# Patient Record
Sex: MALE | Race: WHITE | URBAN - METROPOLITAN AREA
[De-identification: names, ages, dates, MRNs, and addresses within clinical notes are randomized per-mention and may not be internally consistent; named-entity substitution may affect disease eponyms.]

---

## 2017-01-25 ENCOUNTER — OFFICE VISIT (OUTPATIENT)
Dept: URGENT CARE | Facility: URGENT CARE | Age: 62
End: 2017-01-25
Payer: COMMERCIAL

## 2017-01-25 VITALS
SYSTOLIC BLOOD PRESSURE: 134 MMHG | WEIGHT: 314 LBS | OXYGEN SATURATION: 98 % | HEART RATE: 108 BPM | BODY MASS INDEX: 37.73 KG/M2 | TEMPERATURE: 100.3 F | DIASTOLIC BLOOD PRESSURE: 90 MMHG

## 2017-01-25 DIAGNOSIS — J02.8 ACUTE PHARYNGITIS DUE TO OTHER SPECIFIED ORGANISMS: ICD-10-CM

## 2017-01-25 DIAGNOSIS — J01.90 ACUTE SINUSITIS WITH SYMPTOMS > 10 DAYS: ICD-10-CM

## 2017-01-25 DIAGNOSIS — J02.0 ACUTE STREPTOCOCCAL PHARYNGITIS: Primary | ICD-10-CM

## 2017-01-25 LAB
DEPRECATED S PYO AG THROAT QL EIA: ABNORMAL
MICRO REPORT STATUS: ABNORMAL
SPECIMEN SOURCE: ABNORMAL

## 2017-01-25 PROCEDURE — 99212 OFFICE O/P EST SF 10 MIN: CPT | Performed by: FAMILY MEDICINE

## 2017-01-25 PROCEDURE — 87880 STREP A ASSAY W/OPTIC: CPT | Performed by: FAMILY MEDICINE

## 2017-01-25 RX ORDER — AMOXICILLIN 875 MG
875 TABLET ORAL 2 TIMES DAILY
Qty: 20 TABLET | Refills: 0 | Status: SHIPPED | OUTPATIENT
Start: 2017-01-25 | End: 2017-02-04

## 2017-01-25 RX ORDER — MOMETASONE FUROATE MONOHYDRATE 50 UG/1
2 SPRAY, METERED NASAL DAILY
Qty: 1 BOX | Refills: 1 | Status: SHIPPED | OUTPATIENT
Start: 2017-01-25

## 2017-01-25 NOTE — PATIENT INSTRUCTIONS
Saline nasal rinses    Steam or Humidifier for the nose    Drink plenty of liquids    Warm saltwater gargles    Ibuprofen, Tylenol for the pain or fever.     follow up if not better in 10-14 days.

## 2017-01-25 NOTE — MR AVS SNAPSHOT
"              After Visit Summary   1/25/2017    Vasile Roberts    MRN: [id number]           Patient Information     Date Of Birth          1955        Visit Information        Provider Department      1/25/2017 3:30 PM Mendez Chaudhry MD [facility]        Today's Diagnoses     Acute streptococcal pharyngitis    -  1     Acute pharyngitis due to other specified organisms         Acute sinusitis with symptoms > 10 days           Care Instructions    Saline nasal rinses    Steam or Humidifier for the nose    Drink plenty of liquids    Warm saltwater gargles    Ibuprofen, Tylenol for the pain or fever.     follow up if not better in 10-14 days.         Follow-ups after your visit        Who to contact     If you have questions or need follow up information about today's clinic visit or your schedule please contact [facility] directly at [phone].  Normal or non-critical lab and imaging results will be communicated to you by Centrafusehart, letter or phone within 4 business days after the clinic has received the results. If you do not hear from us within 7 days, please contact the clinic through MyChart or phone. If you have a critical or abnormal lab result, we will notify you by phone as soon as possible.  Submit refill requests through App[employer] or call your pharmacy and they will forward the refill request to us. Please allow 3 business days for your refill to be completed.          Additional Information About Your Visit        MyChart Information     [employer] lets you send messages to your doctor, view your test results, renew your prescriptions, schedule appointments and more. To sign up, go to [email or web address] . Click on \"Log in\" on the left side of the screen, which will take you to the Welcome page. Then click on \"Sign up Now\" on the right side of the page.     You will be asked to enter the access code listed below, as well as some personal information. Please follow the " directions to create your username and password.     Your access code is: [email or web address]  Expires: 2017  4:57 PM     Your access code will  in 90 days. If you need help or a new code, please call your [place] clinic or [phone].        Care EveryWhere ID     This is your Care EveryWhere ID. This could be used by other organizations to access your [place] medical records  [id number]        Your Vitals Were     Pulse Temperature Pulse Oximetry             108 100.3  F (37.9  C) (Tympanic) 98%          Blood Pressure from Last 3 Encounters:   17 134/90   16 140/90    Weight from Last 3 Encounters:   17 314 lb (142.429 kg)   16 314 lb 14.4 oz (142.838 kg)              We Performed the Following     Strep, Rapid Screen          Today's Medication Changes          These changes are accurate as of: 17  4:57 PM.  If you have any questions, ask your nurse or doctor.               Start taking these medicines.        Dose/Directions    amoxicillin 875 MG tablet   Commonly known as:  AMOXIL   Used for:  Acute streptococcal pharyngitis, Acute sinusitis with symptoms > 10 days   Started by:  Mendez Chaudhry MD        Dose:  875 mg   Take 1 tablet (875 mg) by mouth 2 times daily for 10 days   Quantity:  20 tablet   Refills:  0       mometasone 50 MCG/ACT spray   Commonly known as:  NASONEX   Used for:  Acute sinusitis with symptoms > 10 days   Started by:  Mendez Chaudhry MD        Dose:  2 spray   Spray 2 sprays into both nostrils daily   Quantity:  1 Box   Refills:  1            Where to get your medicines      Some of these will need a paper prescription and others can be bought over the counter.  Ask your nurse if you have questions.     Bring a paper prescription for each of these medications    - amoxicillin 875 MG tablet  - mometasone 50 MCG/ACT spray             Primary Care Provider    None Specified       No primary provider on file.        Thank you!     Thank you for choosing  [facility]  for your care. Our goal is always to provide you with excellent care. Hearing back from our patients is one way we can continue to improve our services. Please take a few minutes to complete the written survey that you may receive in the mail after your visit with us. Thank you!             Your Updated Medication List - Protect others around you: Learn how to safely use, store and throw away your medicines at www.disposemymeds.org.          This list is accurate as of: 1/25/17  4:57 PM.  Always use your most recent med list.                   Brand Name Dispense Instructions for use    amoxicillin 875 MG tablet    AMOXIL    20 tablet    Take 1 tablet (875 mg) by mouth 2 times daily for 10 days       mometasone 50 MCG/ACT spray    NASONEX    1 Box    Spray 2 sprays into both nostrils daily

## 2017-01-25 NOTE — NURSING NOTE
"Chief Complaint   Patient presents with     Urgent Care     nasal congestion, cough, HA, ST. x 2 months       Initial /90 mmHg  Pulse 108  Temp(Src) 100.3  F (37.9  C) (Tympanic)  Wt 314 lb (142.429 kg)  SpO2 98% Estimated body mass index is 37.73 kg/(m^2) as calculated from the following:    Height as of 9/22/16: 6' 4.5\" (1.943 m).    Weight as of this encounter: 314 lb (142.429 kg).  BP completed using cuff size: large.Katalina MAC MA      "

## 2017-01-25 NOTE — Clinical Note
[facility]  [address]  [address]  [place] [address] [phone]      January 25, 2017    RE:  Vasile Roberts                                                                                                                                                       [address]   [place] [address]            To whom it may concern:    Vasile Roberts is under my professional care at the [facility] on January 25, 2017.  Because of his current medical illness, please excuse his absence from work on January 26, 2017.    He  may return to work once he starts to feel better.         Sincerely,        Mendez Chaudhry MD    [facility]

## 2017-01-25 NOTE — PROGRESS NOTES
"SUBJECTIVE:   Vasile Roberts is a 61 year old male presenting with a chief complaint of nasal congestion, nasal discharge (thick green/white),  cough (occasional coughing attacks, nonproductive cough), headache (at the temples, moderate headache, \"just hurts\"), sore throat (moderate pain).  Onset of symptoms was two months ago for the nasal congestion, and a few days ago for the other symptoms.   Course of illness is worsening.     Current and Associated symptoms: as listed above.   Treatment measures tried include none. .  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     Past medical history:    No major medical problems.     No current outpatient prescriptions on file.     Social History   Substance Use Topics     Smoking status: Current Some Day Smoker     Types: Pipe     Smokeless tobacco: Never Used     Alcohol Use: 0.0 oz/week     0 Standard drinks or equivalent per week       ROS:  Review of systems negative except as stated above.    OBJECTIVE  :/90 mmHg  Pulse 108  Temp(Src) 100.3  F (37.9  C) (Tympanic)  Wt 314 lb (142.429 kg)  SpO2 98%  GENERAL APPEARANCE: healthy, alert and no distress.  Voice sounds as though the nose is congested.   HENT: TM's normal bilaterally, nasal turbinates erythematous, swollen and oropharynx is erythematous without exudates.   NECK: supple, nontender, no lymphadenopathy  RESP: lungs clear to auscultation - no rales, rhonchi or wheezes  CV: regular rates and rhythm, normal S1 S2, no murmur noted    RST:    Results for orders placed or performed in visit on 01/25/17   Strep, Rapid Screen   Result Value Ref Range    Specimen Description Throat     Rapid Strep A Screen (A)      POSITIVE: Group A Streptococcal antigen detected by immunoassay.    Micro Report Status FINAL 01/25/2017          ASSESSMENT:  Acute Pharyngitis  Strep Pharyngitis  Acute Sinusitis.      PLAN:  Rx:  Flonase, Nasonex  Steam, humidifier  Warm saltwater gargles  Ice-cold " water  Ibuprofen, Tylenol for pain and for fever.   follow up if not better in 10-14 days.   See orders in Epic    Mendez Chaudhry MD

## 2017-05-27 ENCOUNTER — OFFICE VISIT (OUTPATIENT)
Dept: URGENT CARE | Facility: URGENT CARE | Age: 62
End: 2017-05-27

## 2017-05-27 VITALS
HEART RATE: 89 BPM | TEMPERATURE: 97.6 F | SYSTOLIC BLOOD PRESSURE: 142 MMHG | WEIGHT: 313.8 LBS | BODY MASS INDEX: 37.7 KG/M2 | OXYGEN SATURATION: 98 % | DIASTOLIC BLOOD PRESSURE: 94 MMHG

## 2017-05-27 DIAGNOSIS — J01.90 ACUTE SINUSITIS WITH SYMPTOMS > 10 DAYS: Primary | ICD-10-CM

## 2017-05-27 PROCEDURE — 99213 OFFICE O/P EST LOW 20 MIN: CPT | Performed by: FAMILY MEDICINE

## 2017-05-27 RX ORDER — AMOXICILLIN 875 MG
875 TABLET ORAL 2 TIMES DAILY
Qty: 20 TABLET | Refills: 0 | Status: SHIPPED | OUTPATIENT
Start: 2017-05-27 | End: 2017-06-06

## 2017-05-27 NOTE — NURSING NOTE
"Chief Complaint   Patient presents with     Urgent Care     Sinus Problem     runny nose, intermittent left eye swelling, ears clogged x 2 wks       Initial BP (!) 142/94  Pulse 89  Temp 97.6  F (36.4  C) (Oral)  Wt (!) 313 lb 12.8 oz (142.3 kg)  SpO2 98%  BMI 37.7 kg/m2 Estimated body mass index is 37.7 kg/(m^2) as calculated from the following:    Height as of 9/22/16: 6' 4.5\" (1.943 m).    Weight as of this encounter: 313 lb 12.8 oz (142.3 kg).  Medication Reconciliation: complete      Maida Mcpherson, CHETAN                                5/27/2017 3:00 PM     "

## 2017-05-27 NOTE — MR AVS SNAPSHOT
"              After Visit Summary   5/27/2017    Vasile Roberts    MRN: [id number]           Patient Information     Date Of Birth          1955        Visit Information        Provider Department      5/27/2017 2:45 PM Mendez Chaudhry MD [facility]        Today's Diagnoses     Acute sinusitis with symptoms > 10 days    -  1      Care Instructions    Saline nasal rinses    follow up with the primary care provider if not better in 10-14 days.     Steam or Humidifier    Flonase (Fluticasone) nasal spray.  Two sprays into each nostril daily until you feel better.           Follow-ups after your visit        Who to contact     If you have questions or need follow up information about today's clinic visit or your schedule please contact [facility] directly at [phone].  Normal or non-critical lab and imaging results will be communicated to you by MyChart, letter or phone within 4 business days after the clinic has received the results. If you do not hear from us within 7 days, please contact the clinic through MyChart or phone. If you have a critical or abnormal lab result, we will notify you by phone as soon as possible.  Submit refill requests through [employer] or call your pharmacy and they will forward the refill request to us. Please allow 3 business days for your refill to be completed.          Additional Information About Your Visit        MyChart Information     [employer]o lets you send messages to your doctor, view your test results, renew your prescriptions, schedule appointments and more. To sign up, go to [email or web address] . Click on \"Log in\" on the left side of the screen, which will take you to the Welcome page. Then click on \"Sign up Now\" on the right side of the page.     You will be asked to enter the access code listed below, as well as some personal information. Please follow the directions to create your username and password.     Your access code is: " [email or web address]  Expires: 2017  3:51 PM     Your access code will  in 90 days. If you need help or a new code, please call your [place] clinic or [phone].        Care EveryWhere ID     This is your Care EveryWhere ID. This could be used by other organizations to access your [place] medical records  [id number]        Your Vitals Were     Pulse Temperature Pulse Oximetry BMI (Body Mass Index)          89 97.6  F (36.4  C) (Oral) 98% 37.7 kg/m2         Blood Pressure from Last 3 Encounters:   17 (!) 142/94   17 134/90   16 140/90    Weight from Last 3 Encounters:   17 (!) 313 lb 12.8 oz (142.3 kg)   17 (!) 314 lb (142.4 kg)   16 (!) 314 lb 14.4 oz (142.8 kg)              Today, you had the following     No orders found for display         Today's Medication Changes          These changes are accurate as of: 17  3:52 PM.  If you have any questions, ask your nurse or doctor.               Start taking these medicines.        Dose/Directions    amoxicillin 875 MG tablet   Commonly known as:  AMOXIL   Used for:  Acute sinusitis with symptoms > 10 days   Started by:  Mendez Chaudhry MD        Dose:  875 mg   Take 1 tablet (875 mg) by mouth 2 times daily for 10 days   Quantity:  20 tablet   Refills:  0            Where to get your medicines      Some of these will need a paper prescription and others can be bought over the counter.  Ask your nurse if you have questions.     Bring a paper prescription for each of these medications     amoxicillin 875 MG tablet                Primary Care Provider    None Specified       No primary provider on file.        Thank you!     Thank you for choosing [facility]  for your care. Our goal is always to provide you with excellent care. Hearing back from our patients is one way we can continue to improve our services. Please take a few minutes to complete the written survey that you may receive in the mail after your  visit with us. Thank you!             Your Updated Medication List - Protect others around you: Learn how to safely use, store and throw away your medicines at www.disposemymeds.org.          This list is accurate as of: 5/27/17  3:52 PM.  Always use your most recent med list.                   Brand Name Dispense Instructions for use    amoxicillin 875 MG tablet    AMOXIL    20 tablet    Take 1 tablet (875 mg) by mouth 2 times daily for 10 days       mometasone 50 MCG/ACT spray    NASONEX    1 Box    Spray 2 sprays into both nostrils daily       MULTIVITAMIN ADULT PO          TYLENOL PO

## 2017-05-27 NOTE — PROGRESS NOTES
SUBJECTIVE:   Vasile Roberts is a 61 year old male presenting with a chief complaint of runny nose (yellowish-green, thick discharge), nasal congestion, clogged sensation in the ears, off-and-on left eye swelling.  Patient has been mouth breathing a lot recently.  No vision problems.  No eye pain.  No light sensitivity.    Onset of symptoms was two weeks ago.  Course of illness is worsening. .    Severity severe.   Current and Associated symptoms: as listed above.   Treatment measures tried include Tylenol and over the counter nasal decongestants.  Predisposing factors include recurrent sinus infections. .    Past medical history:    No major medical problems    Current Outpatient Prescriptions   Medication Sig Dispense Refill     Multiple Vitamins-Minerals (MULTIVITAMIN ADULT PO)        Acetaminophen (TYLENOL PO)        mometasone (NASONEX) 50 MCG/ACT spray Spray 2 sprays into both nostrils daily 1 Box 1     Social History   Substance Use Topics     Smoking status: Current Some Day Smoker     Types: Pipe     Smokeless tobacco: Never Used     Alcohol use 0.0 oz/week     0 Standard drinks or equivalent per week       ROS:  Review of systems negative except as stated above.    OBJECTIVE  :BP (!) 142/94  Pulse 89  Temp 97.6  F (36.4  C) (Oral)  Wt (!) 313 lb 12.8 oz (142.3 kg)  SpO2 98%  BMI 37.7 kg/m2  GENERAL APPEARANCE: healthy, alert and no distress  HENT: TM's normal bilaterally, nasal turbinates erythematous, swollen and oral mucous membranes moist, no erythema noted  NECK: supple, nontender, no lymphadenopathy  RESP: lungs clear to auscultation - no rales, rhonchi or wheezes  CV: regular rates and rhythm, normal S1 S2, no murmur noted    ASSESSMENT:  Acute Sinsusitis    PLAN:  Rx:  Amoxicillin  Flonase  Saline nasal rinses  Humidifier, Steam  follow up with the primary care provider if not better in 10-14 days.    See orders in Epic    Mendez Chaudhry MD

## 2017-05-27 NOTE — PATIENT INSTRUCTIONS
Saline nasal rinses    follow up with the primary care provider if not better in 10-14 days.     Steam or Humidifier    Flonase (Fluticasone) nasal spray.  Two sprays into each nostril daily until you feel better.

## 2017-06-27 ENCOUNTER — NURSE TRIAGE (OUTPATIENT)
Dept: NURSING | Facility: CLINIC | Age: 62
End: 2017-06-27

## 2017-06-27 NOTE — TELEPHONE ENCOUNTER
Additional Information    Negative: Severe difficulty breathing (e.g., struggling for each breath, speaks in single words)    Negative: Sounds like a life-threatening emergency to the triager    Negative: [1] Sinus infection AND [2] taking an antibiotic AND [3] symptoms continue    Negative: [1] Difficulty breathing AND [2] not from stuffy nose (e.g., not relieved by cleaning out the nose)    Negative: [1] SEVERE headache AND [2] fever    Negative: [1] Redness or swelling on the cheek, forehead or around the eye AND [2] fever    Negative: Fever > 104 F (40 C)    Negative: Patient sounds very sick or weak to the triager    Negative: [1] SEVERE pain AND [2] not improved 2 hours after pain medicine    Negative: [1] Redness or swelling on the cheek, forehead or around the eye AND [2] no fever    Negative: [1] Fever > 101 F (38.3 C) AND [2] age > 60    Negative: [1] Fever > 101 F (38.3 C) AND [2] bedridden (e.g., nursing home patient, CVA, chronic illness, recovering from surgery)    Negative: [1] Fever > 100.5 F (38.1 C) AND [2] diabetes mellitus or weak immune system (e.g., HIV positive, cancer chemo, splenectomy, organ transplant, chronic steroids)    Negative: Fever present > 3 days (72 hours)    Negative: [1] Fever returns after gone for over 24 hours AND [2] symptoms worse or not improved    Negative: [1] Sinus pain (not just congestion) AND [2] fever    Negative: Earache    Negative: [1] Sinus congestion (pressure, fullness) AND [2] present > 10 days    Negative: [1] Nasal discharge AND [2] present > 10 days    Negative: [1] Using nasal washes and pain medicine > 24 hours AND [2] sinus pain (around cheekbone or eye) persists    Negative: Lots of coughing    [1] Sinus congestion as part of a cold AND [2] present < 10 days (all triage questions negative)    Protocols used: SINUS PAIN OR CONGESTION-ADULT-AH  Patient recently treated for sinusitis and requesting antibiotic.  Per protocol, prescription for  Amoxicillin 1000mg by mouth 3 times per day X10d called to.[facility] [place], [place] - [address] Ana Scott, RN  [facility]

## 2017-07-22 ENCOUNTER — OFFICE VISIT (OUTPATIENT)
Dept: URGENT CARE | Facility: URGENT CARE | Age: 62
End: 2017-07-22

## 2017-07-22 VITALS
HEART RATE: 105 BPM | OXYGEN SATURATION: 97 % | TEMPERATURE: 97.8 F | SYSTOLIC BLOOD PRESSURE: 144 MMHG | DIASTOLIC BLOOD PRESSURE: 84 MMHG

## 2017-07-22 DIAGNOSIS — J32.0 MAXILLARY SINUSITIS, UNSPECIFIED CHRONICITY: Primary | ICD-10-CM

## 2017-07-22 PROCEDURE — 99213 OFFICE O/P EST LOW 20 MIN: CPT | Performed by: FAMILY MEDICINE

## 2017-07-22 RX ORDER — METHYLPREDNISOLONE 4 MG
TABLET, DOSE PACK ORAL
Qty: 21 TABLET | Refills: 0 | Status: SHIPPED | OUTPATIENT
Start: 2017-07-22

## 2017-07-22 RX ORDER — FLUTICASONE PROPIONATE 50 MCG
1 SPRAY, SUSPENSION (ML) NASAL DAILY
Qty: 1 BOTTLE | Refills: 0 | Status: SHIPPED | OUTPATIENT
Start: 2017-07-22

## 2017-07-22 NOTE — PROGRESS NOTES
SUBJECTIVE:   Vasile Roberts is a 61 year old male presenting with a chief complaint of sinusitis.  He has been having sinus pain/pressure intermittently since last fall.   At that time he was given flonase and augmentin and symptoms resolved.  Then in January he had onset of sinus pain/pressure in both cheeks and thick discharge that was treated with nasonex and amoxicillin.  Slightly improved, but never resolved.  He then was seen in May for the same symptoms and treated with amoxicillin and flonase and symptom slightly improved but not resolved.  He called in to nurse triage a month later and was prescribed amoxicillin over the phone.  He states he has not been using the nasal steroids as he doesn't feel like they help and he can't breath in well so doesn't feel he is getting anything out of it.  He just finished taking abx about 3 weeks ago.  No change in symptoms noticed with the treatment since January.  Pretty consistent sinus pressure and drainage.   No fevers.  He is fatigued a lot of the time and notices some sore throat symptoms today.  Sneezing since onset of the symptoms in January.  No know seasonal or environmental allergies. Very frustrated as he is so stuffed up he can't breath well through his nose.  Has no PCP, has never had allergy testing done, no h/o sinus surgery.  Non smoker, no h/o asthma.     ROS:  5-Point Review of Systems Negative-- Except as stated above.    OBJECTIVE  /84 (BP Location: Right arm, Patient Position: Chair, Cuff Size: Adult Large)  Pulse 105  Temp 97.8  F (36.6  C) (Oral)  SpO2 97%  GENERAL:  Awake, alert and interactive. No acute distress.  HEENT:   NC/AT, EOMI, clear conjunctiva. Nose congested.  Oropharynx with mild erythema, no posterior oropharynx drainage noted, moist and clear.  TM's dull and EAC's benign.  NECK: supple and free of adenopathy  CHEST:  Lungs are clear, no rhonchi, wheezing or rales. Normal symmetric air entry throughout both lung fields.    HEART:  S1 and S2 normal, no murmurs, clicks, gallops or rubs. Regular rate and rhythm.    ASSESSMENT/PLAN    ICD-10-CM    1. Maxillary sinusitis, unspecified chronicity J32.0 fluticasone (FLONASE) 50 MCG/ACT spray     methylPREDNISolone (MEDROL DOSEPAK) 4 MG tablet     Just off abx x 2 rounds in past 2 months without significant change in symptoms.  Will try an oral steroid taper.   Then go on to nasal steroid as his sinus congestion improving.  Discussed with ongoing symptoms, close f/u with PCP in about 2 weeks for recheck and if not improving once again, may need further evaluation.    We discussed the expected course and symptomatic cares in detail, including return to care if symptoms not improving as expected, do not resolve completely, or if any new or worsening symptoms develop.    Patient Instructions   Start the oral steroid taper today.    Start the flonase as you are finishing up that steroid taper.   Recheck in about 2 weeks with your primary provider (stop at the reception desk on your way out today to set that up and establish with a primary provider).       If any new or worsening symptoms develop, see your primary provider sooner.

## 2017-07-22 NOTE — NURSING NOTE
"Chief Complaint   Patient presents with     Urgent Care     Sinus Problem     pressure and drainage for over 2 weeks     Pharyngitis     Fatigue      Initial /84 (BP Location: Right arm, Patient Position: Chair, Cuff Size: Adult Large)  Pulse 105  Temp 97.8  F (36.6  C) (Oral)  SpO2 97% Estimated body mass index is 37.7 kg/(m^2) as calculated from the following:    Height as of 9/22/16: 6' 4.5\" (1.943 m).    Weight as of 5/27/17: 313 lb 12.8 oz (142.3 kg)..  BP completed using cuff size: large  S DEEP, CMA    "

## 2017-07-22 NOTE — MR AVS SNAPSHOT
"              After Visit Summary   7/22/2017    Vasile Roberts    MRN: [id number]           Patient Information     Date Of Birth          1955        Visit Information        Provider Department      7/22/2017 12:05 PM Fernanda Jon DO [facility]        Today's Diagnoses     Maxillary sinusitis, unspecified chronicity    -  1      Care Instructions    Start the oral steroid taper today.    Start the flonase as you are finishing up that steroid taper.   Recheck in about 2 weeks with your primary provider (stop at the reception desk on your way out today to set that up and establish with a primary provider).       If any new or worsening symptoms develop, see your primary provider sooner.           Follow-ups after your visit        Who to contact     If you have questions or need follow up information about today's clinic visit or your schedule please contact [facility] directly at [phone].  Normal or non-critical lab and imaging results will be communicated to you by MyChart, letter or phone within 4 business days after the clinic has received the results. If you do not hear from us within 7 days, please contact the clinic through [employer] or phone. If you have a critical or abnormal lab result, we will notify you by phone as soon as possible.  Submit refill requests through [employer] or call your pharmacy and they will forward the refill request to us. Please allow 3 business days for your refill to be completed.          Additional Information About Your Visit        [employer] Information     [employer] lets you send messages to your doctor, view your test results, renew your prescriptions, schedule appointments and more. To sign up, go to [email or web address] . Click on \"Log in\" on the left side of the screen, which will take you to the Welcome page. Then click on \"Sign up Now\" on the right side of the page.     You will be asked to enter the access code listed " below, as well as some personal information. Please follow the directions to create your username and password.     Your access code is: [email or web address]  Expires: 2017  3:51 PM     Your access code will  in 90 days. If you need help or a new code, please call your [facility] or [phone].        Care EveryWhere ID     This is your Care EveryWhere ID. This could be used by other organizations to access your [facility] medical records  [id number]        Your Vitals Were     Pulse Temperature Pulse Oximetry             105 97.8  F (36.6  C) (Oral) 97%          Blood Pressure from Last 3 Encounters:   17 144/84   17 (!) 142/94   17 134/90    Weight from Last 3 Encounters:   17 (!) 313 lb 12.8 oz (142.3 kg)   17 (!) 314 lb (142.4 kg)   16 (!) 314 lb 14.4 oz (142.8 kg)              Today, you had the following     No orders found for display         Today's Medication Changes          These changes are accurate as of: 17 12:40 PM.  If you have any questions, ask your nurse or doctor.               Start taking these medicines.        Dose/Directions    fluticasone 50 MCG/ACT spray   Commonly known as:  FLONASE   Used for:  Maxillary sinusitis, unspecified chronicity   Started by:  Fernanda Jon DO        Dose:  1 spray   Spray 1 spray into both nostrils daily   Quantity:  1 Bottle   Refills:  0       methylPREDNISolone 4 MG tablet   Commonly known as:  MEDROL DOSEPAK   Used for:  Maxillary sinusitis, unspecified chronicity   Started by:  Fernanda Jon DO        Follow package instructions   Quantity:  21 tablet   Refills:  0            Where to get your medicines      These medications were sent to [facility] [place] [facility] [address]  [address], [place] [address]     Phone:  [phone]     fluticasone 50 MCG/ACT spray    methylPREDNISolone 4 MG tablet                Primary Care Provider    Fvl None       No address  on file        Equal Access to Services     SYLVIAMARYANN ANASTASIIA : Hadii aad ku hadjohanabimbola Berta, wadaneda luqleti, qahector avinashgianasilver galvin, waxnataliya kathi castillo. So [facility] [phone].    ATENCIÓN: Si habla español, tiene a sainz disposición servicios gratuitos de asistencia lingüística. Llame al [phone].    We comply with applicable federal civil rights laws and [place] laws. We do not discriminate on the basis of race, color, national origin, age, disability sex, sexual orientation or gender identity.            Thank you!     Thank you for choosing [facility]  for your care. Our goal is always to provide you with excellent care. Hearing back from our patients is one way we can continue to improve our services. Please take a few minutes to complete the written survey that you may receive in the mail after your visit with us. Thank you!             Your Updated Medication List - Protect others around you: Learn how to safely use, store and throw away your medicines at www.disposemymeds.org.          This list is accurate as of: 7/22/17 12:40 PM.  Always use your most recent med list.                   Brand Name Dispense Instructions for use Diagnosis    fluticasone 50 MCG/ACT spray    FLONASE    1 Bottle    Spray 1 spray into both nostrils daily    Maxillary sinusitis, unspecified chronicity       methylPREDNISolone 4 MG tablet    MEDROL DOSEPAK    21 tablet    Follow package instructions    Maxillary sinusitis, unspecified chronicity       mometasone 50 MCG/ACT spray    NASONEX    1 Box    Spray 2 sprays into both nostrils daily    Acute sinusitis with symptoms > 10 days       MULTIVITAMIN ADULT PO           TYLENOL PO

## 2024-05-26 NOTE — PATIENT INSTRUCTIONS
Start the oral steroid taper today.    Start the flonase as you are finishing up that steroid taper.   Recheck in about 2 weeks with your primary provider (stop at the reception desk on your way out today to set that up and establish with a primary provider).       If any new or worsening symptoms develop, see your primary provider sooner.    Take Ibuprofen 800 mg every 8 hours as directed for fever or discomfort.  Take Tylenol 1000 mg every 6 hours as directed for fever or discomfort.  Take Mucinex (blue box) as directed to loosen secretions.  Use a nasal steroid spray as directed (Nasacort, Flonase, Nasonex, etc) to decrease sinus swelling.  Use a nasal saline rinse as directed (Ramesh med bottle or Neti pot) to flush sinus congestion out.  Stay well hydrated and rested.  You can also try salt water gargles, throat lozenges, or chloraseptic spray (over the counter) to relieve the sore throat.    Throw away your toothbrush in 3-4 days.  Take antibiotics as directed and complete medication.    This antibiotic can cause sun sensitivity so wear sunblock and a hat when you are out in the sun but also try to avoid sun exposure altogether while you are on the antibiotic.  Also, do not use a tanning bed for the next 2 weeks.    Do not take your multivitamin or other supplements within 2 hours of taking the antibiotic.    Avoid dairy products for 2 hours before and 2 hours after taking the doxycycline.    Be sure to take it with a little bit of food.    Use Albuterol inhaler as needed for wheezing and shortness of breath.  May take tessalon up to 3 times a day as needed for cough.      Follow up immediately if you have a persistent fever, difficulty swallowing, difficulty breathing, vomiting, or other worsening symptoms.  Otherwise follow up with your primary care doctor in 7-10 days if there is no improvement.